# Patient Record
Sex: MALE | Race: WHITE | NOT HISPANIC OR LATINO | URBAN - METROPOLITAN AREA
[De-identification: names, ages, dates, MRNs, and addresses within clinical notes are randomized per-mention and may not be internally consistent; named-entity substitution may affect disease eponyms.]

---

## 2018-07-17 ENCOUNTER — COMMUNICATION - HEALTHEAST (OUTPATIENT)
Dept: INTERNAL MEDICINE | Facility: CLINIC | Age: 28
End: 2018-07-17

## 2018-07-26 ENCOUNTER — OFFICE VISIT - HEALTHEAST (OUTPATIENT)
Dept: INTERNAL MEDICINE | Facility: CLINIC | Age: 28
End: 2018-07-26

## 2018-07-26 DIAGNOSIS — E78.5 HYPERLIPEMIA: ICD-10-CM

## 2018-07-26 DIAGNOSIS — Z00.00 HEALTHCARE MAINTENANCE: ICD-10-CM

## 2018-07-26 LAB
ALBUMIN SERPL-MCNC: 4.3 G/DL (ref 3.5–5)
ALBUMIN UR-MCNC: NEGATIVE MG/DL
ALP SERPL-CCNC: 73 U/L (ref 45–120)
ALT SERPL W P-5'-P-CCNC: 32 U/L (ref 0–45)
ANION GAP SERPL CALCULATED.3IONS-SCNC: 10 MMOL/L (ref 5–18)
APPEARANCE UR: CLEAR
AST SERPL W P-5'-P-CCNC: 45 U/L (ref 0–40)
BILIRUB SERPL-MCNC: 0.6 MG/DL (ref 0–1)
BILIRUB UR QL STRIP: NEGATIVE
BUN SERPL-MCNC: 22 MG/DL (ref 8–22)
CALCIUM SERPL-MCNC: 10 MG/DL (ref 8.5–10.5)
CHLORIDE BLD-SCNC: 108 MMOL/L (ref 98–107)
CO2 SERPL-SCNC: 25 MMOL/L (ref 22–31)
COLOR UR AUTO: YELLOW
CREAT SERPL-MCNC: 1.46 MG/DL (ref 0.7–1.3)
ERYTHROCYTE [DISTWIDTH] IN BLOOD BY AUTOMATED COUNT: 12.3 % (ref 11–14.5)
GFR SERPL CREATININE-BSD FRML MDRD: 57 ML/MIN/1.73M2
GLUCOSE BLD-MCNC: 84 MG/DL (ref 70–125)
GLUCOSE UR STRIP-MCNC: NEGATIVE MG/DL
HCT VFR BLD AUTO: 44.2 % (ref 40–54)
HGB BLD-MCNC: 14.9 G/DL (ref 14–18)
HGB UR QL STRIP: NEGATIVE
KETONES UR STRIP-MCNC: NEGATIVE MG/DL
LDLC SERPL CALC-MCNC: 98 MG/DL
LEUKOCYTE ESTERASE UR QL STRIP: NEGATIVE
MCH RBC QN AUTO: 29 PG (ref 27–34)
MCHC RBC AUTO-ENTMCNC: 33.7 G/DL (ref 32–36)
MCV RBC AUTO: 86 FL (ref 80–100)
NITRATE UR QL: NEGATIVE
PH UR STRIP: 5.5 [PH] (ref 5–8)
PLATELET # BLD AUTO: 181 THOU/UL (ref 140–440)
PMV BLD AUTO: 8.6 FL (ref 7–10)
POTASSIUM BLD-SCNC: 4.7 MMOL/L (ref 3.5–5)
PROT SERPL-MCNC: 6.7 G/DL (ref 6–8)
RBC # BLD AUTO: 5.13 MILL/UL (ref 4.4–6.2)
SODIUM SERPL-SCNC: 143 MMOL/L (ref 136–145)
SP GR UR STRIP: 1.01 (ref 1–1.03)
TSH SERPL DL<=0.005 MIU/L-ACNC: 2.74 UIU/ML (ref 0.3–5)
UROBILINOGEN UR STRIP-ACNC: NORMAL
WBC: 5.4 THOU/UL (ref 4–11)

## 2018-07-26 ASSESSMENT — MIFFLIN-ST. JEOR: SCORE: 2172.35

## 2018-07-27 ENCOUNTER — COMMUNICATION - HEALTHEAST (OUTPATIENT)
Dept: INTERNAL MEDICINE | Facility: CLINIC | Age: 28
End: 2018-07-27

## 2021-06-01 VITALS — WEIGHT: 232 LBS | BODY MASS INDEX: 26.84 KG/M2 | HEIGHT: 78 IN

## 2021-06-19 NOTE — PROGRESS NOTES
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 Healthcare maintenance     2. Hyperlipemia  Comprehensive Metabolic Panel    LDL Cholesterol, Direct    HM2(CBC w/o Differential)    Urinalysis- if Indicated    Thyroid Stimulating Hormone (TSH)       Meds:  No current outpatient prescriptions on file.     No current facility-administered medications for this visit.    No past medical history on file.  No past surgical history on file.  Social History     Social History     Marital status: Single     Spouse name: N/A     Number of children: N/A     Years of education: N/A     Occupational History           Professional       [place]; retired 2017     Social History Main Topics     Smoking status: Never Smoker     Smokeless tobacco: Never Used     Alcohol use 1.8 - 2.4 oz/week     1 Glasses of wine, 1 - 2 Cans of beer, 1 Shots of liquor per week      Comment: per week     Drug use: No     Sexual activity: Yes     Partners: Female     Other Topics Concern     Not on file     Social History Narrative    Single with significant girlfriend; dad is Griffin Somers patient here at [facility] and uncle is Dr. Vahid Somers internist downtown.  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  He has had a long-term significant girlfriend.  Non-smoker infrequently drinks alcohol     Family History   Problem Relation Age of Onset     Lymphoma Father      Stroke Father      Hypertension Father      Celiac disease Paternal Uncle      Heart disease Paternal Grandfather          No Known Allergies    ROS: complete review of symptoms  "otherwise negative except as noted below    S: Napoleon wanted to come in and have a general physical he had been seen for several years as I recall he had some physicals before college when he played basketball [employer].  6- 7 and has been very healthy he is lost about 20 pounds from his playing days in college; right several years in [place] out of [place] and professional basketball is now back in town working in mortgage banking his dad was recently diagnosed with a very low-grade lymphoma.  He does not require treatment.  Gamal himself feels very healthy no issues or concerns he just wanted make the appointment and get reestablished is had a significant girlfriend for many years       O:   Vitals:    07/26/18 1038   BP: 102/68   Patient Site: Left Arm   Patient Position: Sitting   Cuff Size: Adult Regular   Pulse: 72   Temp: 97.9  F (36.6  C)   TempSrc: Tympanic   SpO2: 99%   Weight: (!) 232 lb (105.2 kg)   Height: 6' 8\" (2.032 m)       Physical Exam:  General-very tall athletic pleasant man; much more of an outgoing personality than when he was in college and very shy  VS- see above  HEENT- neg   Neck- no adenopathy/thyromegaly/bruits  Chest- clear   Cor- reg no murmurs/gallops/ectopics; no diastolic murmurs or abnormalities noted  Extremities: no edema, good distal pulses excellent muscle tone  Neuro- Cr. NN-  intact, alert,   Abdomen- soft non tender, no masses; no organomegaly    Recent Results (from the past 240 hour(s))   Comprehensive Metabolic Panel   Result Value Ref Range    Sodium 143 136 - 145 mmol/L    Potassium 4.7 3.5 - 5.0 mmol/L    Chloride 108 (H) 98 - 107 mmol/L    CO2 25 22 - 31 mmol/L    Anion Gap, Calculation 10 5 - 18 mmol/L    Glucose 84 70 - 125 mg/dL    BUN 22 8 - 22 mg/dL    Creatinine 1.46 (H) 0.70 - 1.30 mg/dL    GFR MDRD Af Amer >60 >60 mL/min/1.73m2    GFR MDRD Non Af Amer 57 (L) >60 mL/min/1.73m2    Bilirubin, Total 0.6 0.0 - 1.0 mg/dL    Calcium 10.0 8.5 - 10.5 mg/dL    Protein, " Total 6.7 6.0 - 8.0 g/dL    Albumin 4.3 3.5 - 5.0 g/dL    Alkaline Phosphatase 73 45 - 120 U/L    AST 45 (H) 0 - 40 U/L    ALT 32 0 - 45 U/L   LDL Cholesterol, Direct   Result Value Ref Range    Direct LDL 98 <=129 mg/dl   HM2(CBC w/o Differential)   Result Value Ref Range    WBC 5.4 4.0 - 11.0 thou/uL    RBC 5.13 4.40 - 6.20 mill/uL    Hemoglobin 14.9 14.0 - 18.0 g/dL    Hematocrit 44.2 40.0 - 54.0 %    MCV 86 80 - 100 fL    MCH 29.0 27.0 - 34.0 pg    MCHC 33.7 32.0 - 36.0 g/dL    RDW 12.3 11.0 - 14.5 %    Platelets 181 140 - 440 thou/uL    MPV 8.6 7.0 - 10.0 fL   Urinalysis-UC if Indicated   Result Value Ref Range    Color, UA Yellow Colorless, Yellow, Straw, Light Yellow    Clarity, UA Clear Clear    Glucose, UA Negative Negative    Bilirubin, UA Negative Negative    Ketones, UA Negative Negative    Specific Gravity, UA 1.010 1.005 - 1.030    Blood, UA Negative Negative    pH, UA 5.5 5.0 - 8.0    Protein, UA Negative Negative mg/dL    Urobilinogen, UA 0.2 E.U./dL 0.2 E.U./dL, 1.0 E.U./dL    Nitrite, UA Negative Negative    Leukocytes, UA Negative Negative   Thyroid Stimulating Hormone (TSH)   Result Value Ref Range    TSH 2.74 0.30 - 5.00 uIU/mL             Timur Ledesma MD